# Patient Record
Sex: FEMALE | Race: BLACK OR AFRICAN AMERICAN
[De-identification: names, ages, dates, MRNs, and addresses within clinical notes are randomized per-mention and may not be internally consistent; named-entity substitution may affect disease eponyms.]

---

## 2017-01-25 ENCOUNTER — HOSPITAL ENCOUNTER (OUTPATIENT)
Dept: HOSPITAL 39 - MAMMO | Age: 57
End: 2017-01-25
Attending: NURSE PRACTITIONER
Payer: MEDICARE

## 2017-01-25 DIAGNOSIS — Z12.31: Primary | ICD-10-CM

## 2017-01-25 PROCEDURE — 77052: CPT

## 2017-01-25 NOTE — MAM
EXAM DESCRIPTION:

MAMMO BREAST SCREENING BILATERAL

CAD, images were reviewed with CAD technology, R2 computer-aided

detection.



CLINICAL HISTORY:

[facility].



COMPARISON:

2013



FINDINGS:

Routine views are obtained.  Glandular tissue is near completely fatty

involuted. No dominant mass, architectural distortion or clustered

microcalcification..



IMPRESSION:

Benign exam.



BIRAD CATEGORY: 2 BENIGN



RECOMMENDATIONS:

FOLLOW-UP:  Routine screening mammogram in one year.



According to the [employer], yearly mammograms are

recommended starting at age 40 and continuing as long as a woman is in

good health.  Any breast change noted on a breast self-exam should be

reported promptly to the patient's healthcare provider.  Breast MRI is

recommended for women with an approximately 20-25% or greater lifetime

risk of breast cancer, including women with a strong family history of

breast or ovarian cancer and women who have been treated for Hodgkin's

disease.



Electronically signed by: Anais Garcia  01/25/2017 11:04

## 2017-06-01 ENCOUNTER — HOSPITAL ENCOUNTER (OUTPATIENT)
Dept: HOSPITAL 39 - YCFC.O | Age: 57
Discharge: HOME | End: 2017-06-01
Attending: NURSE PRACTITIONER
Payer: MEDICARE

## 2017-06-01 DIAGNOSIS — E11.9: Primary | ICD-10-CM

## 2017-09-28 ENCOUNTER — HOSPITAL ENCOUNTER (OUTPATIENT)
Dept: HOSPITAL 39 - RAD | Age: 57
Discharge: HOME | End: 2017-09-28
Attending: ORTHOPAEDIC SURGERY
Payer: MEDICARE

## 2017-09-28 DIAGNOSIS — M25.561: Primary | ICD-10-CM

## 2017-09-28 DIAGNOSIS — Z01.818: ICD-10-CM

## 2017-09-28 DIAGNOSIS — M25.551: ICD-10-CM

## 2017-09-29 NOTE — RAD
Procedure:  XR KNEE 4 OR MORE VIEWS        



Exam Date: 9/28/2017 9:07 AM CDT



Ordering Provider:  GHADA YOUNG



Clinical Indication:  PAIN IN RIGHT KNEE



Comparison: None





Findings:



No fractures or subluxations. No  joint effusion seen. No

subcutaneous gas or radiopaque foreign body. No lytic or

sclerotic lesions identified. 



Severe lateral and moderate medial compartment osteoarthrosis.

There is also patellofemoral spurring.



Impression:



Nonacute right knee series.



Osteoarthritis as above.



Electronically signed by:  Pernell Ferreira MD  9/29/2017 6:23 AM

CDT Workstation: [id number]

## 2017-09-29 NOTE — RAD
Procedure:  XR PELVIS 1-2 VIEWS        



Exam Date:  9/28/2017 9:07 AM CDT



Ordering Provider:  GHADA YOUNG



Clinical Indication:  PAIN IN RIGHT HIP



Comparison: None



FINDINGS:  

There is no fracture or dislocation. 



Articular surface of each hip has a normal appearance. 

The sacroiliac joints have a normal appearance bilaterally. 

The pubic symphysis is normal. 

There are no lytic or sclerotic lesions. 

There are no suspicious calcifications.



Impression:

1. Negative exam of the pelvis and each hip.



Electronically signed by:  Pernell Ferreira MD  9/29/2017 6:33 AM

CDT Workstation: [id number]

## 2017-10-11 ENCOUNTER — HOSPITAL ENCOUNTER (OUTPATIENT)
Dept: HOSPITAL 39 - LAB.O | Age: 57
Discharge: HOME | End: 2017-10-11
Attending: NURSE PRACTITIONER
Payer: MEDICARE

## 2017-10-11 DIAGNOSIS — Z01.810: Primary | ICD-10-CM

## 2017-10-11 DIAGNOSIS — Z01.811: ICD-10-CM

## 2017-10-11 DIAGNOSIS — Z01.812: ICD-10-CM

## 2017-10-12 NOTE — RAD
History: Preoperative evaluation.



Chest x-ray:

PA and lateral views are obtained. Contour of the heart and

mediastinum is normal. No significant infiltrate or effusion.

Deformity, chronic-appearing upper lumbar spine.



IMPRESSION:

No active process in the chest.



Electronically signed by:  Anais Garcia MD  10/12/2017 9:37 AM

CDT Workstation: [id number]

## 2017-10-16 NOTE — HP
CHIEF COMPLAINT:  Right knee pain.  



HISTORY OF PRESENT ILLNESS:  Ms. Joshua is a 57-year-old female with a history 
of severe pain that has been getting progressively worse for which she has had 
conservative measures.  Unfortunately, she has been unable to get any relief.  
Because of that ongoing issue, she has requested operative intervention.  After 
discussing the risks, benefits and alternatives to that, the patient has given 
informed consent.  



PAST SURGICAL HISTORY:  None.



MEDICATIONS:  

1.  Metformin.

2.  Omeprazole.

3.  Lisinopril.

4.  Amitriptyline.



ALLERGIES:   PENICILLIN, ALEVE.



CODE STATUS:  Full code.



IMMUNIZATIONS:  Up to date.



SOCIAL HISTORY:  The patient does not drink, smoke or use any illicit drugs.  



FAMILY HISTORY:  None pertinent to today's complaint.



REVIEW OF SYSTEMS:  Negative except as indicated in the History of Present 
Illness.



PHYSICAL EXAMINATION:



VITAL SIGNS:  Blood pressure 116/76.  Pulse 75.  Height 5'5".  Weight 322.  



GENERAL:  The patient is  an obese female in no acute distress.



MENTAL STATUS:  The patient is awake, alert, and is able to give a good history 
and participate in the physical.  The patient is oriented to person, place and 
time.



SKIN:  Normal tone and turgor.



HEENT:  Normocephalic, atraumatic.  Pupils equal, round and reactive.  Mucosal 
membranes are moist.



NECK:  Normal range of motion.  No thyromegaly, no lymphadenopathy. 



CHEST:  Normal respiratory excursion.



CARDIAC:  Regular rate and rhythm.  No murmurs, rubs or gallops.



MUSCULOSKELETAL:  The bilateral upper extremities show full active range of 
motion without pain.  She has intact sensation in the extremities and they are 
warm and well perfused.  She has no deformity and strength is 5/5.  The left 
lower extremity shows full range of motion of the hip.  She has moderate pain 
with deep palpation of the knee.  She has mild crepitus.  There is no 
deformity.  There is no malalignment.  She has full extension and flexion 
strength.  The right knee shows severe pain with palpation both medially and 
laterally.  She has severe pain with patellar mobilization.  She has full range 
of motion of the hip.  She has full extension of the knee and flexion is to 
about 110 degrees.  She has crepitus throughout her range of motion.  She has a 
mild effusion.  She has slight valgus deformity and slight valgus laxity, but 
no varus laxity and no anterior/posterior laxity noted.  



IMAGING:  X-rays show severe arthritis of the knee with valgus deformity.



ASSESSMENT:

1.  Osteoarthritis.



PLAN: The plan at this point is for total knee arthroplasty.  We have discussed 
the risks, benefits, and alternatives to that and the patient has given 
informed consent.



[facility][id number]
[facility]

## 2017-10-18 ENCOUNTER — HOSPITAL ENCOUNTER (INPATIENT)
Dept: HOSPITAL 39 - AMB | Age: 57
LOS: 3 days | Discharge: NURSING, REHAB OR LONG-TERM CARE | DRG: 470 | End: 2017-10-21
Attending: NURSE PRACTITIONER | Admitting: ORTHOPAEDIC SURGERY
Payer: MEDICARE

## 2017-10-18 DIAGNOSIS — M17.11: Primary | ICD-10-CM

## 2017-10-18 DIAGNOSIS — Z88.0: ICD-10-CM

## 2017-10-18 DIAGNOSIS — N32.0: ICD-10-CM

## 2017-10-18 DIAGNOSIS — Z87.891: ICD-10-CM

## 2017-10-18 DIAGNOSIS — J44.9: ICD-10-CM

## 2017-10-18 DIAGNOSIS — Z79.84: ICD-10-CM

## 2017-10-18 DIAGNOSIS — Z79.899: ICD-10-CM

## 2017-10-18 DIAGNOSIS — E11.9: ICD-10-CM

## 2017-10-18 DIAGNOSIS — E66.9: ICD-10-CM

## 2017-10-18 DIAGNOSIS — J45.909: ICD-10-CM

## 2017-10-18 DIAGNOSIS — Z88.6: ICD-10-CM

## 2017-10-18 DIAGNOSIS — I10: ICD-10-CM

## 2017-10-18 DIAGNOSIS — Z79.51: ICD-10-CM

## 2017-10-18 PROCEDURE — 0SRC0J9 REPLACEMENT OF RIGHT KNEE JOINT WITH SYNTHETIC SUBSTITUTE, CEMENTED, OPEN APPROACH: ICD-10-PCS | Performed by: ORTHOPAEDIC SURGERY

## 2017-10-18 RX ADMIN — CEFAZOLIN SODIUM SCH MLS/HR: 2 SOLUTION INTRAVENOUS at 18:32

## 2017-10-18 RX ADMIN — DOCUSATE CALCIUM SCH MG: 240 CAPSULE, LIQUID FILLED ORAL at 21:44

## 2017-10-18 RX ADMIN — CELECOXIB SCH: 100 CAPSULE ORAL at 15:10

## 2017-10-18 RX ADMIN — VANCOMYCIN HYDROCHLORIDE SCH MLS/HR: 500 INJECTION, POWDER, LYOPHILIZED, FOR SOLUTION INTRAVENOUS at 21:48

## 2017-10-18 RX ADMIN — TRANEXAMIC ACID ONE MG: 100 INJECTION, SOLUTION INTRAVENOUS at 13:35

## 2017-10-18 RX ADMIN — TRANEXAMIC ACID ONE MG: 100 INJECTION, SOLUTION INTRAVENOUS at 09:29

## 2017-10-18 RX ADMIN — Medication SCH: at 15:12

## 2017-10-18 RX ADMIN — CELECOXIB SCH MG: 100 CAPSULE ORAL at 16:37

## 2017-10-19 RX ADMIN — CYCLOBENZAPRINE HYDROCHLORIDE PRN MG: 10 TABLET, FILM COATED ORAL at 17:27

## 2017-10-19 RX ADMIN — INSULIN LISPRO SCH: 100 INJECTION, SOLUTION INTRAVENOUS; SUBCUTANEOUS at 17:31

## 2017-10-19 RX ADMIN — INSULIN LISPRO SCH: 100 INJECTION, SOLUTION INTRAVENOUS; SUBCUTANEOUS at 08:31

## 2017-10-19 RX ADMIN — DOCUSATE CALCIUM SCH MG: 240 CAPSULE, LIQUID FILLED ORAL at 21:02

## 2017-10-19 RX ADMIN — CEFAZOLIN SODIUM SCH MLS/HR: 2 SOLUTION INTRAVENOUS at 12:35

## 2017-10-19 RX ADMIN — Medication SCH MG: at 08:58

## 2017-10-19 RX ADMIN — CEFAZOLIN SODIUM SCH MLS/HR: 2 SOLUTION INTRAVENOUS at 03:09

## 2017-10-19 RX ADMIN — BUDESONIDE AND FORMOTEROL FUMARATE DIHYDRATE SCH PUFF: 160; 4.5 AEROSOL RESPIRATORY (INHALATION) at 21:37

## 2017-10-19 RX ADMIN — CELECOXIB SCH MG: 100 CAPSULE ORAL at 17:27

## 2017-10-19 RX ADMIN — CELECOXIB SCH MG: 100 CAPSULE ORAL at 08:58

## 2017-10-19 RX ADMIN — ENOXAPARIN SODIUM SCH MG: 30 INJECTION, SOLUTION INTRAVENOUS; SUBCUTANEOUS at 13:29

## 2017-10-19 RX ADMIN — AMITRIPTYLINE HYDROCHLORIDE SCH MG: 25 TABLET, FILM COATED ORAL at 21:01

## 2017-10-19 RX ADMIN — VANCOMYCIN HYDROCHLORIDE SCH MLS/HR: 500 INJECTION, POWDER, LYOPHILIZED, FOR SOLUTION INTRAVENOUS at 08:58

## 2017-10-19 RX ADMIN — ENOXAPARIN SODIUM SCH MG: 30 INJECTION, SOLUTION INTRAVENOUS; SUBCUTANEOUS at 00:33

## 2017-10-19 NOTE — OP
DATE OF PROCEDURE: 10/18/17



PREOPERATIVE DIAGNOSIS: 

1.  Knee osteoarthritis.



POSTOPERATIVE DIAGNOSIS: 

1.  Knee osteoarthritis.



PROCEDURE: 

1.  Total knee arthroplasty.



SURGEON:  Nino Dior MD.



ASSISTANT:  Yovanny Salmeron CST, -KAYLA.



ANESTHESIA:  General.



COMPLICATIONS:  None.



FINDINGS:   Severe arthritis of the knee with valgus deformity.



INDICATION:  Ms. Joshua has a long history of knee pain.  We have been seeing 
her for several years and unfortunately she has failed conservative measures.  
Because of her failure of conservative measures, she has requested operative 
intervention.  After discussing the risks, benefits and alternatives to that, 
the patient has given informed consent for total knee arthroplasty.



PROCEDURE:  The patient was brought to the Operating Room and placed in supine 
position.  General anesthesia was induced and the patient's leg was sterilely 
prepped and draped.  Following prepping and draping, the distal femur was 
exposed and using an intramedullary guide, the distal femoral cut was made.  
The appropriate sized cutting block was measured, pinned into place, and the 
anterior, posterior, and chamfer cuts were made.  The ACL was transected and 
the tibia was subluxed.  Both the medial and lateral menisci were removed.  An 
intramedullary guide was used to make the proximal tibial cut.  The appropriate 
sized base plate was placed and a trial polyethylene was placed.  The trial 
femur was placed, the knee was reduced, and the knee was taken through a range 
of motion.  The knee was stable in anterior, posterior, varus and valgus 
stress.  The patella tracked anatomically without evidence of subluxation or 
dislocation.  After trialing, the trial components were removed and the bony 
surfaces were thoroughly irrigated with saline.  Following irrigation, the 
surfaces were dried and the final components were cemented into place.  The 
excess cement was removed and the remaining cement was allowed to cure.  The 
knee was again taken through a range of motion to confirm stability.  The wound 
was then irrigated with saline and closure was performed using PDS to 
approximate the arthrotomy followed by closure of the subcutaneous tissues with 
a combination of running and interrupted Monocryl sutures.  Sterile dressing 
was placed.  The patient was awoken from anesthesia and taken to Recovery.



POSTOPERATIVE INSTRUCTIONS:  The patient will be weight-bearing as tolerated on 
postoperative day 1.



COMPONENTS:  [employer] Triathlon knee, size 5 femur, size 5 tibia, 11 mm insert.



#[id number]
[facility]

## 2017-10-19 NOTE — PN
DATE:  10/19/17



SUBJECTIVE: She is doing well and having minimal pain right now.



OBJECTIVE:  Afebrile.  Vital signs stable.  Dressing is clean, dry and intact.



ASSESSMENT:  Status post total knee arthroplasty.



PLAN:  She will begin weight-bearing as tolerated today.  She will also be 
doing CPM and progress that as tolerated.  



#[id number]
[facility]

## 2017-10-19 NOTE — PN
DATE:  10/19/17



SUBJECTIVE:  The patient is lying in the bed and is fully awake and alert.  She 
is able to work quite hard with her rehab today and feels a little sore all 
over because of it.  She had difficulty with delayed ability to pass urine 
after her Rolle catheter was removed earlier this morning and about 12 hours 
later had several hundred mL's when catheter was replaced.  She tolerated the 
procedure well and we will remove the catheter in the morning, again give her 
another trial of spontaneous urination.  She continues on her Albuterol inhaler 
on an as needed basis.



OBJECTIVE:  Afebrile, blood pressure 149/90, saturation 99% on room air.  
Hemoglobin postoperatively this morning was 11.2.  No cultures obtained.



ASSESSMENT: 

1.   Postoperative day number 1 total right knee arthroplasty.

2.   Diabetes mellitus type 2.

3.   Mild degree of bladder outlet obstruction probably secondary to some edema

      from previous catheter placement with Rolle catheter to be left in 
overnight 

      tonight and removed in the morning.

4.   Mild asthmatic symptoms using a bronchodilator.

5.   History of hypertension.



PLAN:  Continue with rehabilitation until she is able to safely return home.  
Reevaluate in the morning.



#[id number]
[facility]

## 2017-10-19 NOTE — CONS
DATE OF CONSULTATION:  10/18/17



HISTORY OF PRESENT ILLNESS: This 57-year-old black female was admitted to the 
hospital earlier this morning for elective orthopedic surgical procedure to 
replace her right knee to assist with symptom control which has been hurting 
her now for a number of years.  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 
lot for many years and her pain has been getting worse now for the last 4 or 5 
years to the point of intolerance.  The patient tolerated the procedure earlier 
this morning and is now entering into the rehabilitation phase to allow her to 
reach a point of rehab where she will be able to safely return home.  



PAST MEDICAL HISTORY: 

1.  Pneumonia about 7 years ago.

2.  Chronic diabetes mellitus on metformin.

3.  Hypertension.

4.  History of asthma and chronic obstructive pulmonary disease.



PAST SURGICAL HISTORY:  

1.  Right knee replacement surgery performed today.

2.  Two children in the past.



CURRENT MEDICATIONS: Please refer to nursing notes for a list of verified home 
medicines.



ALLERGIES:   PENICILLIN, ALEVE.



FAMILY HISTORY: Positive for diabetes mellitus, cancer, coronary artery disease 
and strokes.  



SOCIAL HISTORY:  She works as a CNA.  She apparently stopped smoking about 40 
years ago, but has been exposed to a lot of secondhand smoke through the years. 



REVIEW OF SYSTEMS:  GENERAL:  No significant weight change, fever or chills.  

HEENT: No hearing or visual disturbances.  

LUNGS: Generally clear with slight diminishment in the breath sounds noted.  

CARDIOVASCULAR: Heart tones are somewhat distant, yet no gallops noted.

GASTROINTESTINAL: Abdomen obese, yet soft.  No organomegaly, masses or 
tenderness.  

EXTREMITIES:  Right knee is in a dressing which is clean and she is now using 
her passive range of motion for exercise of the joint.  

NEUROLOGIC: No focal weaknesses.



PHYSICAL EXAMINATION:



VITAL SIGNS:  Afebrile.  Pulse 74.  Blood pressure 124/84.  Pulse oximetry 93% 
on room air.  Weight 123.4 kg on bed scale.  



GENERAL:  The patient is awake, alert and cheerful.  She is a good historian.  



HEENT:  Within normal limits.



NECK:  Supple.



LUNGS:  Generally clear.  



CARDIOVASCULAR:  Heart tones are regular.



ABDOMEN:  Obese, yet soft with no organomegaly, masses or tenderness.



EXTREMITIES:  Right knee is in a dressing which is clear at this time of any 
type of drainage.  She continues on her passive range of motion exercise of the 
joint.  



NEUROLOGIC:  No focal neurological deficits are noted.



LABORATORY:   Glucose was 101 at surgery and 135 postoperatively.  No cultures 
obtained.  



ASSESSMENT:

1.  Immediate postoperative day 0 right total knee arthroplasty performed by

     Dr. Dior, orthopedic surgeon.

2.  Chronic osteoarthritis with degenerative changes which has failed outpatient

      and requiring surgical intervention to assist with symptom control.

3.  Diabetes mellitus, type 2, on oral thank you.

4.  History of hypertension.

5.  History of asthma and possibly early chronic obstructive pulmonary disease, 

     currently not smoking.  



PLAN:  The patient will be placed in a rehabilitation program supervised by 
physical therapy and orthopedic surgery.  This will endeavor to allow her to 
reach her full rehab potential so that she will be able to return home when 
save and be able to continue with rehabilitation as an outpatient.  She is to 
have further followup with [facility] upon discharge.  Observe 
closely for diabetes management.  Continue with DVT prophylaxis and close 
followup and reevaluation to continue. 



#[id number]
[facility]

## 2017-10-19 NOTE — RAD
EXAM DESCRIPTION: Knee, right 2 or More Views



CLINICAL HISTORY: 57 Female, TKA



COMPARISON: 9/28/2017



FINDINGS: Immediate postoperative images demonstrate right total

knee arthroplasty with prosthesis in appropriate position.

Osteopenia. Operative changes in the right knee soft tissues. No

acute fracture.



IMPRESSION:



1. Right total knee arthroplasty in appropriate position. 



Electronically signed by:  Vladislav Asencio  10/19/2017 1:57

AM CDT Workstation: [id number]

## 2017-10-20 RX ADMIN — INSULIN LISPRO SCH: 100 INJECTION, SOLUTION INTRAVENOUS; SUBCUTANEOUS at 08:11

## 2017-10-20 RX ADMIN — ENOXAPARIN SODIUM SCH MG: 30 INJECTION, SOLUTION INTRAVENOUS; SUBCUTANEOUS at 14:16

## 2017-10-20 RX ADMIN — ENOXAPARIN SODIUM SCH MG: 30 INJECTION, SOLUTION INTRAVENOUS; SUBCUTANEOUS at 01:01

## 2017-10-20 RX ADMIN — BUDESONIDE AND FORMOTEROL FUMARATE DIHYDRATE SCH PUFF: 160; 4.5 AEROSOL RESPIRATORY (INHALATION) at 20:43

## 2017-10-20 RX ADMIN — HYDROCODONE BITARTRATE AND ACETAMINOPHEN PRN EA: 5; 325 TABLET ORAL at 14:16

## 2017-10-20 RX ADMIN — FLUCONAZOLE SCH MG: 100 TABLET ORAL at 09:37

## 2017-10-20 RX ADMIN — HYDROCODONE BITARTRATE AND ACETAMINOPHEN PRN EA: 5; 325 TABLET ORAL at 07:59

## 2017-10-20 RX ADMIN — CELECOXIB SCH MG: 100 CAPSULE ORAL at 17:29

## 2017-10-20 RX ADMIN — CELECOXIB SCH MG: 100 CAPSULE ORAL at 08:18

## 2017-10-20 RX ADMIN — CYCLOBENZAPRINE HYDROCHLORIDE PRN MG: 10 TABLET, FILM COATED ORAL at 15:28

## 2017-10-20 RX ADMIN — INSULIN LISPRO SCH: 100 INJECTION, SOLUTION INTRAVENOUS; SUBCUTANEOUS at 17:29

## 2017-10-20 RX ADMIN — BUDESONIDE AND FORMOTEROL FUMARATE DIHYDRATE SCH PUFF: 160; 4.5 AEROSOL RESPIRATORY (INHALATION) at 09:13

## 2017-10-20 RX ADMIN — AMITRIPTYLINE HYDROCHLORIDE SCH MG: 25 TABLET, FILM COATED ORAL at 20:31

## 2017-10-20 RX ADMIN — Medication SCH ML: at 20:32

## 2017-10-20 RX ADMIN — Medication SCH: at 09:36

## 2017-10-20 RX ADMIN — Medication SCH MG: at 09:37

## 2017-10-20 RX ADMIN — DOCUSATE CALCIUM SCH MG: 240 CAPSULE, LIQUID FILLED ORAL at 20:32

## 2017-10-20 RX ADMIN — HYDROCODONE BITARTRATE AND ACETAMINOPHEN PRN EA: 5; 325 TABLET ORAL at 21:08

## 2017-10-20 NOTE — PN
DATE:  10/20/17



SUPERVISING PHYSICIAN:  Derrell Gallagher M.D.



SUBJECTIVE:  The patient is lying in her hospital bed.  She is in no acute 
distress.  She denies chest pain, nausea, vomiting, diarrhea, shortness of 
breath or pruritus.



OBJECTIVE:  VITAL SIGNS: T max in 24 hours is 99.3, pulse rate 78, blood 
pressure 144/80, respiratory rate 16, O2 sat is 97% on room air.  RESPIRATORY: 
Clear to auscultation bilaterally.  CARDIAC: Regular rate and rhythm.  ABDOMEN:
  Soft, nondistended, non-tender.   Breath sounds are positive.  EXTREMITIES: 
No cyanosis or clubbing.  Bilateral pedal pulses are palpable at +2.  The 
dressing to her right knee is dry and intact.  NEUROLOGIC: She is awake, alert 
and oriented times three.



LABORATORY:  There are labs or films to report at this time.



ASSESSMENT: 

1.   Osteoarthritis of the right knee status post right total knee arthroplasty 
performed 

       by Dr. Nino Dior, postoperative day #2.

2.   Diabetes mellitus type 2.

3.   Mild degree of bladder outlet obstruction probably secondary to some edema 

      from previous catheter placement.  Her Rolle has been discontinued and she

      has voided times 2.

4.   Mild asthmatic symptoms.

5.   History of hypertension.



PLAN:  We will continue present supportive care.  Strengthening and 
conditioning will be per Physical Therapy.  Orthopedic issues per Dr. Dior.  I 
have encouraged good pulmonary hygiene.  We will continue to monitor the 
patient closely and follow as needed.  Dr. Gallagher is the collaborating physician 
available for consultation.



#[id number]
[facility]

## 2017-10-21 ENCOUNTER — HOSPITAL ENCOUNTER (INPATIENT)
Dept: HOSPITAL 39 - MS | Age: 57
LOS: 4 days | Discharge: HOME HEALTH | DRG: 561 | End: 2017-10-25
Attending: NURSE PRACTITIONER | Admitting: NURSE PRACTITIONER
Payer: MEDICARE

## 2017-10-21 VITALS — OXYGEN SATURATION: 100 % | SYSTOLIC BLOOD PRESSURE: 127 MMHG | TEMPERATURE: 98 F | DIASTOLIC BLOOD PRESSURE: 78 MMHG

## 2017-10-21 DIAGNOSIS — E11.9: ICD-10-CM

## 2017-10-21 DIAGNOSIS — Z47.1: Primary | ICD-10-CM

## 2017-10-21 DIAGNOSIS — Z88.6: ICD-10-CM

## 2017-10-21 DIAGNOSIS — J44.9: ICD-10-CM

## 2017-10-21 DIAGNOSIS — Z87.891: ICD-10-CM

## 2017-10-21 DIAGNOSIS — Z79.84: ICD-10-CM

## 2017-10-21 DIAGNOSIS — J45.909: ICD-10-CM

## 2017-10-21 DIAGNOSIS — Z88.0: ICD-10-CM

## 2017-10-21 DIAGNOSIS — M17.11: ICD-10-CM

## 2017-10-21 DIAGNOSIS — I10: ICD-10-CM

## 2017-10-21 DIAGNOSIS — Z96.651: ICD-10-CM

## 2017-10-21 RX ADMIN — FLUCONAZOLE SCH MG: 100 TABLET ORAL at 09:28

## 2017-10-21 RX ADMIN — Medication SCH MG: at 09:28

## 2017-10-21 RX ADMIN — HYDROCODONE BITARTRATE AND ACETAMINOPHEN PRN EA: 5; 325 TABLET ORAL at 01:37

## 2017-10-21 RX ADMIN — CELECOXIB SCH MG: 100 CAPSULE ORAL at 08:02

## 2017-10-21 RX ADMIN — HYDROCODONE BITARTRATE AND ACETAMINOPHEN PRN EA: 5; 325 TABLET ORAL at 18:36

## 2017-10-21 RX ADMIN — HYDROCODONE BITARTRATE AND ACETAMINOPHEN PRN EA: 5; 325 TABLET ORAL at 12:48

## 2017-10-21 RX ADMIN — HYDROCODONE BITARTRATE AND ACETAMINOPHEN PRN EA: 5; 325 TABLET ORAL at 22:36

## 2017-10-21 RX ADMIN — BUDESONIDE AND FORMOTEROL FUMARATE DIHYDRATE SCH PUFF: 160; 4.5 AEROSOL RESPIRATORY (INHALATION) at 09:04

## 2017-10-21 RX ADMIN — Medication SCH ML: at 09:29

## 2017-10-21 RX ADMIN — BUDESONIDE AND FORMOTEROL FUMARATE DIHYDRATE SCH PUFF: 160; 4.5 AEROSOL RESPIRATORY (INHALATION) at 20:47

## 2017-10-21 RX ADMIN — INSULIN LISPRO SCH: 100 INJECTION, SOLUTION INTRAVENOUS; SUBCUTANEOUS at 07:33

## 2017-10-21 RX ADMIN — CYCLOBENZAPRINE HYDROCHLORIDE PRN MG: 10 TABLET, FILM COATED ORAL at 18:36

## 2017-10-21 RX ADMIN — ENOXAPARIN SODIUM SCH MG: 30 INJECTION, SOLUTION INTRAVENOUS; SUBCUTANEOUS at 00:35

## 2017-10-21 NOTE — PN
DATE:  10/21/17



SUBJECTIVE:  Ms. Joshua is doing well.  She is ambulating well.



OBJECTIVE:  She is afebrile.  Vital signs are stable.  Wound is clean.  There 
are no signs or symptoms of infection.



ASSESSMENT: 

1.   Status post total knee arthroplasty.



PLAN:  The plan at this point is for her to continue on with therapy.  She will 
be transitioned to Swing Bed.



#[id number]
[facility]

## 2017-10-21 NOTE — PN
DATE:  10/20/17



SUBJECTIVE:  Ms. Walker subjectively is doing well.  She has no significant 
pain right now.  She is up walking.



OBJECTIVE:  She is afebrile.  Vital signs are stable.  Dressing is clean, dry 
and intact.



ASSESSMENT: 

1.   Status post total knee arthroplasty.



PLAN:  The plan at this point is for her to continue on with CPM and 
weightbearing as tolerated.  Will likely make her Swing Bed in the next day.



#[id number]
[facility]

## 2017-10-21 NOTE — HP
SUPERVISING PHYSICIAN:   Derrell Gallagher MD



CHIEF COMPLAINT:   Right total knee arthroplasty.



HISTORY OF PRESENT ILLNESS:   is a 57-year-old patient who was admitted 
to the hospital on 10-18-17 for elective orthopedic surgical procedure for a 
right knee total knee arthroplasty to assist with symptom control.  Her knee 
has hurt her for a number of years and the pain has worsened for the last 4 or 
5 years to the point that she can no longer tolerate the pain.  Postoperatively
, she did well.  Her vital signs remained stable.  She was discharged from the 
acute care setting and will be admitted today to Swing Bed for strengthening 
and conditioning. per physical therapy.



PAST MEDICAL HISTORY: 

1.  Chronic diabetes mellitus on Metformin.

2.  Hypertension.

3.  History of asthma and chronic obstructive pulmonary disease.

4.  Pneumonia about 7 years ago.



PAST SURGICAL HISTORY:

1.  Right knee arthroplasty recently on 10/18/17,



CURRENT MEDICATIONS:  Per the EMR and awaiting verification.



ALLERGIES:    PENICILLIN AND ALEVE. 



FAMILY HISTORY:   Noncontributory.



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  
She denies any ETOH or illicit drug use.



REVIEW OF SYSTEMS:  Negative except as per history of present illness.



PHYSICAL EXAMINATION: 



VITAL SIGNS:  She is afebrile.  Heart rate 79, blood pressure 114/77, 
respiratory rate 16, 02 saturation 98%.



GENERAL:  This is a 57 year-old female patient who is in no acute distress.



HEENT:  Normocephalic and atraumatic.  Pupils are equal and reactive,.  
Oropharynx is clear.



NECK:  Supple without mass.



CHEST:   Lungs are clear to auscultation bilaterally.  There is equal rise and 
fall of the chest with inspiration and expiration. 



CARDIOVASCULAR:   Regular rate and rhythm.



ABDOMEN:   Soft, nondistended, non-tender.  Bowel sounds are positive.



EXTREMITIES:   Bilateral pedal pulses are palpable at +2, She has a dressing to 
her right knee that is dry and intact.



NEUROLOGIC:   She is awake, alert, and oriented x3.



LABORATORY:  There are no labs or films to report at this time.



ASSESSMENT: 

1.  Chronic osteoarthritis requiring right total knee arthroplasty performed by 

     Dr. Nino Dior, orthopedic surgeon, postoperative day #3.

2.  Diabetes mellitus type 2.

3.  Hypertension.

4.  History of asthma and chronic obstructive pulmonary disease with a remote 

     history of smoking.  



PLAN:   We will admit patient for Swing Bed. She will continue her 
strengthening and conditioning per physical therapy.  Her home medications will 
be restarted and I will also continue her Flexeril, Norco and Xarelto.  Will 
also order breathing treatments and bronchial hygiene, plus incentive 
spirometry.  I have also ordered daily fasting blood glucose to monitor her 
diabetes.  Orthopedic issues will be per Dr. Dior.  Hopefully, she can be 
discharged within the next week or so after she has been deemed safe to go 
home. Otherwise, we will continue to monitor the patient closely and followup 
as needed.  Dr. Gallagher is the collaborating physician available for consultation



#[id number]
[facility]

## 2017-10-21 NOTE — DS
SUPERVISING PHYSICIAN:  Derrell Gallagher M.D.



DISCHARGE DIAGNOSIS: 

1.   Osteoarthritis of the right knee status post right total knee arthroplasty 
performed by

      Dr. Nino Dior, orthopedic surgeon, postoperative day #3.

2.   Diabetes mellitus type 2.

3.   Mild degree of bladder outlet dysfunction that is now resolved.

4.   Mild asthmatic symptoms.

5.   History of hypertension.



HISTORY OF PRESENT ILLNESS:  This is a 57 year-old female patient who was 
admitted to the hospital for elective orthopedic surgical procedure of her 
right knee to assist with symptom control which has been hurting her for a 
number of years.  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 
and her pain has been getting worse now for the last 4 to 5 years to the point 
of intolerance.  The patient tolerated her operative procedure well.  Her vital 
signs remained stable during her admission.  Her physical therapy for 
strengthening and conditioning progressed but she will need further physical 
therapy as a Swing Bed patient.  



HOSPITAL COURSE:  During her stay, her hemoglobin and hematocrit were stable at 
11.2 and 33.2.  Her blood sugars ranged between 101 and 150.  She will be 
discharged today from Acute Care and she will be readmitted for Swing Bed 
admission for strengthening and conditioning per Physical Therapy.



DISCHARGE PLAN:  The patient will be discharged from the Acute Care setting to 
Swing Bed admission.  We will resume her previous medications.  I will also 
give her 8 additional days of Xarelto 10 mg daily.  We will restart her pain 
medications as well as her Diflucan and Flexeril, and her Surfak.  Any 
orthopedic issues will be per Dr. Dior, orthopedic surgeon.



DISCHARGE MEDICATIONS:

1.   Albuterol nebulizers.

2.   Omeprazole.

3.   Metformin.

4.   Lisinopril Hydrochlorothiazide.

5.   Symbicort.

6.   Amitriptyline.



PLUS NEW MEDICATIONS:

1.   Xarelto.

2.   Norco.

3.   Diflucan.

4.   Flexeril.

5.   Surfak.



Dr. Gallagher is the collaborating physician available for consultation.



#[id number]
[facility]

## 2017-10-22 RX ADMIN — CYCLOBENZAPRINE HYDROCHLORIDE PRN MG: 10 TABLET, FILM COATED ORAL at 09:16

## 2017-10-22 RX ADMIN — DOCUSATE SODIUM SCH MG: 100 CAPSULE, LIQUID FILLED ORAL at 09:08

## 2017-10-22 RX ADMIN — HYDROCODONE BITARTRATE AND ACETAMINOPHEN PRN EA: 5; 325 TABLET ORAL at 17:26

## 2017-10-22 RX ADMIN — BUDESONIDE AND FORMOTEROL FUMARATE DIHYDRATE SCH PUFF: 160; 4.5 AEROSOL RESPIRATORY (INHALATION) at 20:50

## 2017-10-22 RX ADMIN — HYDROCODONE BITARTRATE AND ACETAMINOPHEN PRN EA: 5; 325 TABLET ORAL at 21:14

## 2017-10-22 RX ADMIN — HYDROCODONE BITARTRATE AND ACETAMINOPHEN PRN EA: 5; 325 TABLET ORAL at 12:27

## 2017-10-22 RX ADMIN — BUDESONIDE AND FORMOTEROL FUMARATE DIHYDRATE SCH PUFF: 160; 4.5 AEROSOL RESPIRATORY (INHALATION) at 08:50

## 2017-10-22 RX ADMIN — TEMAZEPAM PRN MG: 15 CAPSULE ORAL at 21:15

## 2017-10-22 RX ADMIN — AMITRIPTYLINE HYDROCHLORIDE SCH MG: 25 TABLET, FILM COATED ORAL at 21:14

## 2017-10-22 RX ADMIN — TEMAZEPAM PRN MG: 15 CAPSULE ORAL at 00:32

## 2017-10-22 RX ADMIN — HYDROCODONE BITARTRATE AND ACETAMINOPHEN PRN EA: 5; 325 TABLET ORAL at 07:44

## 2017-10-22 RX ADMIN — RIVAROXABAN SCH MG: 10 TABLET, FILM COATED ORAL at 09:08

## 2017-10-22 RX ADMIN — CYCLOBENZAPRINE HYDROCHLORIDE PRN MG: 10 TABLET, FILM COATED ORAL at 17:26

## 2017-10-23 RX ADMIN — CYCLOBENZAPRINE HYDROCHLORIDE PRN MG: 10 TABLET, FILM COATED ORAL at 22:47

## 2017-10-23 RX ADMIN — DOCUSATE SODIUM SCH MG: 100 CAPSULE, LIQUID FILLED ORAL at 09:40

## 2017-10-23 RX ADMIN — TEMAZEPAM PRN MG: 15 CAPSULE ORAL at 21:26

## 2017-10-23 RX ADMIN — CYCLOBENZAPRINE HYDROCHLORIDE PRN MG: 10 TABLET, FILM COATED ORAL at 04:50

## 2017-10-23 RX ADMIN — AMITRIPTYLINE HYDROCHLORIDE SCH MG: 25 TABLET, FILM COATED ORAL at 21:26

## 2017-10-23 RX ADMIN — RIVAROXABAN SCH MG: 10 TABLET, FILM COATED ORAL at 09:41

## 2017-10-23 RX ADMIN — BUDESONIDE AND FORMOTEROL FUMARATE DIHYDRATE SCH PUFF: 160; 4.5 AEROSOL RESPIRATORY (INHALATION) at 10:42

## 2017-10-23 RX ADMIN — HYDROCODONE BITARTRATE AND ACETAMINOPHEN PRN EA: 5; 325 TABLET ORAL at 01:33

## 2017-10-23 RX ADMIN — BUDESONIDE AND FORMOTEROL FUMARATE DIHYDRATE SCH PUFF: 160; 4.5 AEROSOL RESPIRATORY (INHALATION) at 20:58

## 2017-10-23 RX ADMIN — HYDROCODONE BITARTRATE AND ACETAMINOPHEN PRN EA: 5; 325 TABLET ORAL at 13:36

## 2017-10-23 RX ADMIN — HYDROCODONE BITARTRATE AND ACETAMINOPHEN PRN EA: 5; 325 TABLET ORAL at 21:26

## 2017-10-23 NOTE — PN
DATE:  10/23/17



SUBJECTIVE: The patient is lying in the bed and talking to Brandy of the therapy 
department.  She is awake, alert and communicative.  She admits to no acute 
distress at this time. Her appetite is improved.  No shortness of breath, no 
increased swelling of her extremities.  Again encouraged to breathe deeply and 
to continue with pulmonary hygiene and her asthma treatment in progress.  
Diabetes management also will continue.  The patient does live at home alone 
which will require continued decision making as to when the safest time for her 
to return home will be.  Discussed with the therapist and they feel that by the 
end of the week she may be ready to continue with outpatient management at 
home.  



OBJECTIVE:  VITAL SIGNS:  Afebrile.  Pulse 73.  Blood pressure 113/75.  Pulse 
oximetry 98% on room air.  LUNGS:  Clear.  HEART:  Regular.  ABDOMEN:  Obese, 
yet soft.  EXTREMITIES:  No drainage into the knee dressing today.  She 
continues with her p.r.n. albuterol inhaler and uses it about once or twice a 
day as needed.  



ASSESSMENT: 

1.  Postoperative day #5 right total knee arthroplasty performed by Dr. Dior,

     orthopedic surgeon.

2.  Chronic osteoarthritis failing to respond to outpatient therapy and 
requiring

     surgical intervention to assist with symptom control.

3.  Chronic diabetes mellitus.

4.  History of asthma being treated with bronchodilation.



PLAN:  Because of the patient's living at home alone, the patient will require 
ongoing rehabilitation and special Social Service intervention to make sure we 
can make her home transition as smooth and as safe as possible.  After 
discussion with the therapist today, anticipate sending home towards the end of 
this week, depending on how well she responds to the ongoing rehabilitation 
process.  



#[id number]
[facility]

## 2017-10-24 RX ADMIN — DOCUSATE SODIUM SCH MG: 100 CAPSULE, LIQUID FILLED ORAL at 08:07

## 2017-10-24 RX ADMIN — HYDROCODONE BITARTRATE AND ACETAMINOPHEN PRN EA: 5; 325 TABLET ORAL at 14:25

## 2017-10-24 RX ADMIN — CYCLOBENZAPRINE HYDROCHLORIDE PRN MG: 10 TABLET, FILM COATED ORAL at 19:09

## 2017-10-24 RX ADMIN — HYDROCODONE BITARTRATE AND ACETAMINOPHEN PRN EA: 5; 325 TABLET ORAL at 03:06

## 2017-10-24 RX ADMIN — AMITRIPTYLINE HYDROCHLORIDE SCH MG: 25 TABLET, FILM COATED ORAL at 21:19

## 2017-10-24 RX ADMIN — RIVAROXABAN SCH MG: 10 TABLET, FILM COATED ORAL at 08:08

## 2017-10-24 RX ADMIN — CYCLOBENZAPRINE HYDROCHLORIDE PRN MG: 10 TABLET, FILM COATED ORAL at 04:56

## 2017-10-24 RX ADMIN — BUDESONIDE AND FORMOTEROL FUMARATE DIHYDRATE SCH PUFF: 160; 4.5 AEROSOL RESPIRATORY (INHALATION) at 08:47

## 2017-10-24 RX ADMIN — BUDESONIDE AND FORMOTEROL FUMARATE DIHYDRATE SCH PUFF: 160; 4.5 AEROSOL RESPIRATORY (INHALATION) at 20:56

## 2017-10-24 NOTE — PN
DATE:  10/24/17



SUBJECTIVE: The patient is ambulating with therapist up and down the [place]arez.  She 
states the pain in her right knee is still present, but seems to be improving 
compared to 2 days ago.  Continued diabetes management continues.  No shortness 
of breath, no significant pedal edema at this time.  The patient does live at 
home alone and is anticipating [facility] after 
discharge.  



OBJECTIVE:  VITAL SIGNS: Afebrile.  Pulse 70.  Blood pressure 127/80.  Pulse 
oximetry 100% room air.  Weight 120.3 kg.  Blood sugar this morning fasting was 
106.  LUNGS:  Clear.  HEART:  Regular.  ABDOMEN:  Obese, yet soft.  No problem 
with constipation at present time.



ASSESSMENT: 

1.  Postoperative day #6 right total knee arthroplasty performed by Dr. Dior,

     orthopedic surgeon.

2.  Osteoarthritis having failed outpatient therapy and requiring surgical

     intervention to assist with symptom control.

3.  Chronic diabetes mellitus.

4.  History of asthma being treated with bronchodilation.



PLAN:  Physical therapy continues to evaluate the status of the patient and 
when the patient is safely able to return home where she lives alone with home 
health assistance, the patient will be discharged, hopefully within the next 2 
to 3 days as rehab continue.  Continue evaluation.



#[id number]
[facility]

## 2017-10-25 VITALS — SYSTOLIC BLOOD PRESSURE: 131 MMHG | DIASTOLIC BLOOD PRESSURE: 83 MMHG | OXYGEN SATURATION: 100 % | TEMPERATURE: 97.9 F

## 2017-10-25 RX ADMIN — DOCUSATE SODIUM SCH MG: 100 CAPSULE, LIQUID FILLED ORAL at 10:32

## 2017-10-25 RX ADMIN — RIVAROXABAN SCH MG: 10 TABLET, FILM COATED ORAL at 10:32

## 2017-10-25 RX ADMIN — TEMAZEPAM PRN MG: 15 CAPSULE ORAL at 00:13

## 2017-10-25 RX ADMIN — HYDROCODONE BITARTRATE AND ACETAMINOPHEN PRN EA: 5; 325 TABLET ORAL at 00:12

## 2017-10-25 RX ADMIN — BUDESONIDE AND FORMOTEROL FUMARATE DIHYDRATE SCH PUFF: 160; 4.5 AEROSOL RESPIRATORY (INHALATION) at 07:36

## 2017-10-25 NOTE — DS
SUPERVISING PHYSICIAN:  Marino Muller M.D.



DISCHARGE DIAGNOSIS: 

1.   Chronic osteoarthritis requiring a right total knee arthroplasty performed 
by Dr. Nino Dior, orthopedic surgeon, postoperative day #7.

2.   Diabetes mellitus type 2.

3.   Hypertension.

4.   History of asthma and chronic obstructive pulmonary disease with a remote 
history of

      smoking.



HISTORY OF PRESENT ILLNESS:  This is a 57 year-old female patient who was 
originally admitted to the hospital on 10/18/17 for elective orthopedic surgery 
procedure for right total knee arthroplasty to assist with symptom control.  
Her knee has hurt for a number of years and the pain had worsened for the last 
4 or 5 years to the point where she can no longer tolerate the pain.  She had 
Dr. Oneill, orthopedic surgeon, do a right total knee arthroplasty.  



HOSPITAL COURSE:  Postoperatively she did very well, although her strengthening 
and conditioning was rather slow progressing.  She lives alone and due to 
safety concerns as well as poor strength, she was discharged from the Acute 
Care setting and readmitted to the hospital for Swing Bed.  She was admitted on 
October 21st and since then she has progressed very well with her strengthening 
and conditioning, and she will be discharged home today.



DISCHARGE PLAN:  The patient will be discharged home in stable condition.  She 
has a followup appointment with Dr. Dior on 11/03/17 at 9:45 AM.  She is to 
resume her previous diet.  Her activity is to be as per Physical Therapy.  She 
has been discharged on her regular home medications with the addition of 
Cyclobenzaprine, Xarelto for 5 additional days, and Hydrocodone.  She is to 
return to Dr. Dior's office and followup with her primary care physician, Kait Frazier, or return to the hospital for any problems or complications.  She will 
have [facility]'s home health as well as their physical 
therapy.  



DISCHARGE MEDICATIONS:

1.   Albuterol sulfate nebulizers.

2.   Omeprazole.

3.   Metformin.

4.   Lisinopril/hydrochlorothiazide.

5.   Symbicort.

6.   Amitriptyline.

7.   ProAir.

8.   Cyclobenzaprine.

9.   Docusate sodium.

10. Hydrocodone.

11. Xarelto.



Dr. Muller is the collaborating physician available for consultation.



#[id number]
[facility]

## 2017-10-26 NOTE — PN
DATE:  10/24/17



SUBJECTIVE:  Ms. Joshua is doing really well and has been up walking a couple 
of times already.



OBJECTIVE:  Afebrile.  Vital signs stable.  Wound is clean.  There are no signs 
or symptoms of infection.



ASSESSMENT:  Status post total knee arthroplasty.



PLAN:  She will continue with her current therapy status.



#[id number]
[facility]

## 2018-01-29 ENCOUNTER — HOSPITAL ENCOUNTER (OUTPATIENT)
Dept: HOSPITAL 39 - MAMMO | Age: 58
End: 2018-01-29
Attending: NURSE PRACTITIONER
Payer: MEDICARE

## 2018-01-29 DIAGNOSIS — Z12.31: Primary | ICD-10-CM

## 2018-01-31 NOTE — MAM
EXAM DESCRIPTION: 

3D Screening BILATERAL : Digital Mammography.



CLINICAL HISTORY: 

57 years Female SCREENING . No complaints. No family history of

breast cancer. Postmenopausal. No HRT.



COMPARISON: 

2-D digital screening bilateral studies 1/25/2017 and 12/30/2015.

 Report from prior examination also reviewed.



TECHNIQUE: 

Bilateral CC and MLO projection full-field images, 3-D

tomosynthesis digital mammographic technique. Also bilateral 

synthesized CC/ MLO full-field images.  CAD not utilized.  



FINDINGS: 

The breast parenchymal density pattern is:  Scattered areas of

fibroglandular density.   No skin thickening or nipple retraction

 bilateral solitary microcalcifications. Bilateral vascular

calcifications. Left axillary lymph nodes.

No focal, stellate mass or density, focal asymmetry , and no

suspicious microcalcifications bilaterally. Stable mammograms

compared to prior study, taking into account differences in

mammographic technique



IMPRESSION: 

BI-RADS CATEGORY: 2 - BENIGN FINDINGS.

FOLLOW UP: Routine digital bilateral  screening, one year

interval from  January 2018.



Written communication explaining the IMPRESSION and follow-up,

will be mailed to the patient and referring health care provider.



According to the American College of [employer], yearly mammograms

are recommended starting at age 40 and continuing as long as a

woman is in good health.  Any breast change noted on a breast

self-exam should be reported promptly to the patient's healthcare

provider.  Breast MRI is recommended for women with an

approximately 20-25% or greater lifetime risk of breast cancer,

including women with a strong family history of breast or ovarian

cancer and women who have been treated for Hodgkin's disease.



A negative mammographic report should not delay tissue diagnosis

in patients with significant clinical history or physical

findings.



Extremely dense breast tissue limits the sensitivity of digital

mammography. 



Electronically signed by:  Yovanny Vicente MD  1/31/2018 9:22 AM [facility]

Workstation: [id number]

## 2018-03-28 ENCOUNTER — HOSPITAL ENCOUNTER (OUTPATIENT)
Dept: HOSPITAL 39 - YCFC.O | Age: 58
End: 2018-03-28
Attending: NURSE PRACTITIONER
Payer: MEDICARE

## 2018-03-28 DIAGNOSIS — I10: Primary | ICD-10-CM

## 2018-03-28 DIAGNOSIS — E11.40: ICD-10-CM

## 2018-03-28 DIAGNOSIS — Z13.220: ICD-10-CM

## 2018-04-30 ENCOUNTER — HOSPITAL ENCOUNTER (OUTPATIENT)
Dept: HOSPITAL 39 - RAD | Age: 58
End: 2018-04-30
Attending: ORTHOPAEDIC SURGERY
Payer: MEDICARE

## 2018-04-30 DIAGNOSIS — M79.642: ICD-10-CM

## 2018-04-30 DIAGNOSIS — Z01.818: Primary | ICD-10-CM

## 2018-04-30 DIAGNOSIS — M79.641: ICD-10-CM

## 2018-04-30 NOTE — RAD
EXAM DESCRIPTION: Hand,Left 3 Views



CLINICAL HISTORY: HAND PAIN



COMPARISON: None Available.



TECHNIQUE: AP, LATERAL, AND OBLIQUE



FINDINGS:

Three-view left hand shows no fracture or dislocation.

There is no bone lesion.

Degenerative changes are seen at the interphalangeal joint of the

thumb. Cystic changes are seen in the lunate. Mild degenerative

changes at the DIP joints of index and middle fingers.

There is no radiopaque foreign body.



IMPRESSION:



Degenerative changes as described.



Electronically signed by:  Shivam Schwartz MD  4/30/2018 12:34

PM CDT Workstation: [id number]

## 2018-04-30 NOTE — RAD
EXAM DESCRIPTION: Hand,Right 3 Views



CLINICAL HISTORY: HAND PAIN



COMPARISON: None Available.



TECHNIQUE: AP, LATERAL, AND OBLIQUE



FINDINGS:

Three-view right hand shows no fracture or dislocation.

There is no bone lesion.

Degenerative arthritic changes are seen at the interphalangeal

joint of the thumb at the first metacarpal phalangeal joint. Mild

degenerative changes at the in the carpus with small cystic areas

in the scaphoid and capitate and lunate. Lateral view shows mild

degenerative spurring at the DIP joints.

There is no radiopaque foreign body.



IMPRESSION:



Degenerative changes as described.



Electronically signed by:  Shivam Schwartz MD  4/30/2018 12:35

PM CDT Workstation: [id number]

## 2018-06-15 NOTE — HP
CHIEF COMPLAINT:  Right hand numbness.



HISTORY OF PRESENT ILLNESS:  Ms. Joshua is a 58-year-old female with a history 
of numbness in the right hand that has been present for months.  She says it 
has been getting progressively worse.  She denies any trauma related to this, 
denies any radiation of the numbness proximal to the wrist and denies any pain.
  She has had conservative measures, however, has failed to gain relief with 
that and as such has requested operative intervention.  After discussing the 
risks, benefits and alternatives to that, the patient has given informed 
consent.  



PAST SURGICAL HISTORY:  

1.  Total knee arthroplasty.



MEDICATIONS:  

1.  Metformin.

2.  Lisinopril.

3.  Amitriptyline.

4.  Omeprazole.



ALLERGIES:   PENICILLIN, ALEVE.



CODE STATUS:  Full code.



IMMUNIZATIONS:  Up to date.



FAMILY HISTORY:  None pertinent to today's complaint.



SOCIAL HISTORY:  The patient does not drink, smoke or use any illicit drugs.  



REVIEW OF SYSTEMS:  Negative except as indicated in the History of Present 
Illness.



PHYSICAL EXAMINATION:



VITAL SIGNS:  Blood pressure 126/90.  Pulse 65.  Height 5'5".  Weight 296 
pounds.



MENTAL STATUS:  The patient is awake, alert, and is able to give a good history 
and participate in the physical.  The patient is oriented to person, place and 
time.



SKIN:  Normal tone and turgor.



MUSCULOSKELETAL:  She has positive carpal compression test.  She has intact 
sensation proximal to the wrist.  She does have full range of motion in the 
digits.  She has positive Tinel's.   strength is 5/5.  The ulnar aspect of 
the hand is intact with regards to sensation.  She has some minor thenar 
atrophy.



ASSESSMENT:

1.  Carpal tunnel syndrome.



PLAN: The plan at this point is for carpal tunnel release.  We have discussed 
the risks, benefits, and alternatives to that and the patient has given 
informed consent.



#[id number]
[facility]

## 2018-06-20 ENCOUNTER — HOSPITAL ENCOUNTER (OUTPATIENT)
Dept: HOSPITAL 39 - AMB | Age: 58
Discharge: HOME | End: 2018-06-20
Attending: ORTHOPAEDIC SURGERY
Payer: MEDICARE

## 2018-06-20 VITALS — SYSTOLIC BLOOD PRESSURE: 139 MMHG | DIASTOLIC BLOOD PRESSURE: 88 MMHG | TEMPERATURE: 97.4 F | OXYGEN SATURATION: 98 %

## 2018-06-20 DIAGNOSIS — G56.01: Primary | ICD-10-CM

## 2018-06-20 DIAGNOSIS — Z79.899: ICD-10-CM

## 2018-06-20 DIAGNOSIS — Z79.84: ICD-10-CM

## 2018-06-20 DIAGNOSIS — K21.9: ICD-10-CM

## 2018-06-20 DIAGNOSIS — Z87.891: ICD-10-CM

## 2018-06-20 DIAGNOSIS — E11.9: ICD-10-CM

## 2018-06-20 DIAGNOSIS — Z88.8: ICD-10-CM

## 2018-06-20 DIAGNOSIS — I10: ICD-10-CM

## 2018-06-20 DIAGNOSIS — Z88.0: ICD-10-CM

## 2018-06-20 PROCEDURE — 82948 REAGENT STRIP/BLOOD GLUCOSE: CPT

## 2018-06-20 PROCEDURE — 94640 AIRWAY INHALATION TREATMENT: CPT

## 2018-06-20 PROCEDURE — 01810 ANES PX NRV MUSC F/ARM WRST: CPT

## 2018-06-20 PROCEDURE — 64721 CARPAL TUNNEL SURGERY: CPT

## 2018-06-20 PROCEDURE — 36416 COLLJ CAPILLARY BLOOD SPEC: CPT

## 2018-06-21 NOTE — OP
DATE OF PROCEDURE:  06/20/18



PREOPERATIVE DIAGNOSIS:

1.  Carpal tunnel syndrome.



POSTOPERATIVE DIAGNOSIS:

1.  Carpal tunnel syndrome.



PROCEDURE:

1.  Carpal tunnel release.



SURGEON:  Nino Dior MD.



ASSISTANT:  Yovanny Salmeron CST, SA-C.



ANESTHESIA:  Local with sedation.



COMPLICATIONS:  None.



FINDINGS:  Thickening of the transverse carpal ligament.  



INDICATION:  Ms. Joshua has a history of both hand pain and numbness.  She has 
diagnosis of carpal tunnel syndrome.  Because of the failure of conservative 
measures, the patient has requested operative intervention.  After discussing 
the risks, benefits and alternatives to that, the patient has given informed 
consent for carpal tunnel release.



PROCEDURE: The patient was brought to the Operating Room and placed in the 
supine position.  Sedation was administered and local anesthetic was injected 
into the operative area under sterile conditions.  After the injection of 
anesthetic, the arm was sterilely prepped and draped.  A longitudinal incision 
was made directly overlying the transverse carpal ligament and blunt dissection 
was carried down to the ligament.  The transverse carpal ligament was sharply 
transected along its length and a [place] elevator was used to ensure complete 
release of the ligament.  Once release had been confirmed, the wound was 
thoroughly irrigated and the wound was closed with Nylon suture.  A sterile 
dressing was placed and the patient was taken to the Day Surgery Unit.  



PLAN:  At this point, the plan is for range of motion of the digits.  She will 
followup with us in two days.



#[id number]
[facility]

## 2019-02-18 ENCOUNTER — HOSPITAL ENCOUNTER (OUTPATIENT)
Dept: HOSPITAL 39 - MAMMO | Age: 59
End: 2019-02-18
Attending: FAMILY MEDICINE
Payer: MEDICARE

## 2019-02-18 DIAGNOSIS — Z12.31: Primary | ICD-10-CM

## 2019-02-19 NOTE — MAM
EXAM DESCRIPTION: 

3D Screening BILATERAL : Digital Mammography.



CLINICAL HISTORY: 

58 years Female ANNUAL SCREENING . No complaints. No personal or

family history of breast cancer. Childbirth. Premenopausal 33

years. No HRT.  Lifetime risk of developing breast cancer

(Tyrer-Cuzick model)(%):  7.5.



COMPARISON: 

Bilateral screening digital breast tomosynthesis 1/29/2018..    



TECHNIQUE: 

Bilateral CC and MLO projection full-field images, digital

tomosynthesis mammographic technique. Bilateral digital 2-D

full-field MLO images. CAD not available for tomosynthesis or 2-D

images.  



FINDINGS: 

The breast parenchymal density pattern is: Almost entirely fatty.

No skin thickening or nipple retraction.  Bilateral solitary

microcalcifications. Left breast axillary lymph nodes. Bilateral

vascular calcifications.

No new focal, stellate mass or density, focal asymmetry , and no

suspicious microcalcifications bilaterally. Stable mammograms

compared to prior study.  Taking into account, differences in

mammographic technique.



IMPRESSION: 

Benign exam.



BIRAD CATEGORY: 2 BENIGN FINDINGS.



RECOMMENDATIONS:

FOLLOW UP: Routine digital bilateral  mammographic screening, one

year interval from  February 2019.



Written communication explaining the IMPRESSION and follow-up,

will be mailed to the patient and referring health care provider.



According to the American College of Radiology, yearly mammograms

are recommended starting at age 40 and continuing as long as a

woman is in good health.  Any breast change noted on a breast

self-exam should be reported promptly to the patient's healthcare

provider.  Breast MRI is recommended for women with an

approximately 20-25% or greater lifetime risk of breast cancer,

including women with a strong family history of breast or ovarian

cancer and women who have been treated for Hodgkin's disease.



A negative mammographic report should not delay tissue diagnosis

in patients with significant clinical history or physical

findings.



Extremely dense breast tissue limits the sensitivity of digital

mammography. 



Electronically signed by:  Yovanny Vicente MD  2/19/2019 2:28 PM CST

Workstation: [id number]

## 2020-03-10 ENCOUNTER — HOSPITAL ENCOUNTER (OUTPATIENT)
Age: 60
End: 2020-03-10
Payer: MEDICARE

## 2020-03-10 DIAGNOSIS — D64.9: ICD-10-CM

## 2020-03-10 DIAGNOSIS — E53.8: Primary | ICD-10-CM

## 2020-11-19 ENCOUNTER — HOSPITAL ENCOUNTER (OUTPATIENT)
Dept: HOSPITAL 39 - YCFC.O | Age: 60
End: 2020-11-19
Attending: FAMILY MEDICINE
Payer: MEDICARE

## 2020-11-19 DIAGNOSIS — Z03.818: Primary | ICD-10-CM

## 2020-11-19 DIAGNOSIS — R05: ICD-10-CM

## 2021-02-22 ENCOUNTER — HOSPITAL ENCOUNTER (OUTPATIENT)
Dept: HOSPITAL 39 - YCFC.O | Age: 61
End: 2021-02-22
Attending: FAMILY MEDICINE
Payer: MEDICARE

## 2021-02-22 DIAGNOSIS — R06.01: Primary | ICD-10-CM

## 2021-02-22 DIAGNOSIS — R06.00: ICD-10-CM
